# Patient Record
Sex: MALE | Race: WHITE | Employment: UNEMPLOYED | URBAN - METROPOLITAN AREA
[De-identification: names, ages, dates, MRNs, and addresses within clinical notes are randomized per-mention and may not be internally consistent; named-entity substitution may affect disease eponyms.]

---
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
## 2024-09-19 NOTE — ED TRIAGE NOTES
C/o left sided sharp intermittent chest pain x 5 days, radiates to left arm.  Pain randomly comes about.  Substantial cardiac history on maternal side of family, pt is in foster care.    Patient Specific Counseling (Will Not Stick From Patient To Patient): Discussed excision, patient declined Detail Level: Simple Nicotinamide Supplementation Recommendations: Take 500 mg of nicotinamide by mouth twice daily. Detail Level: Detailed Sunscreen Recommendations: Regularly apply at least an SPF 30 broad spectrum sunscreen while outdoors during the day. Detail Level: Generalized Detail Level: Zone